# Patient Record
Sex: FEMALE | Race: WHITE | Employment: FULL TIME | URBAN - NONMETROPOLITAN AREA
[De-identification: names, ages, dates, MRNs, and addresses within clinical notes are randomized per-mention and may not be internally consistent; named-entity substitution may affect disease eponyms.]

---

## 2020-02-28 ENCOUNTER — HOSPITAL ENCOUNTER (EMERGENCY)
Age: 40
Discharge: HOME | End: 2020-02-28
Payer: COMMERCIAL

## 2020-02-28 VITALS
WEIGHT: 188 LBS | TEMPERATURE: 97.4 F | SYSTOLIC BLOOD PRESSURE: 161 MMHG | HEART RATE: 109 BPM | OXYGEN SATURATION: 97 % | RESPIRATION RATE: 16 BRPM | HEIGHT: 66 IN | DIASTOLIC BLOOD PRESSURE: 92 MMHG | BODY MASS INDEX: 30.22 KG/M2

## 2020-02-28 LAB
FLU A ANTIGEN: NEGATIVE
INFLUENZA B AG, EIA: NEGATIVE

## 2020-02-28 PROCEDURE — 99203 OFFICE O/P NEW LOW 30 MIN: CPT

## 2020-02-28 PROCEDURE — 87804 INFLUENZA ASSAY W/OPTIC: CPT

## 2020-02-28 PROCEDURE — 99202 OFFICE O/P NEW SF 15 MIN: CPT | Performed by: NURSE PRACTITIONER

## 2020-02-28 RX ORDER — BROMPHENIRAMINE MALEATE, PSEUDOEPHEDRINE HYDROCHLORIDE, AND DEXTROMETHORPHAN HYDROBROMIDE 2; 30; 10 MG/5ML; MG/5ML; MG/5ML
10 SYRUP ORAL 4 TIMES DAILY PRN
Qty: 200 ML | Refills: 0 | Status: SHIPPED | OUTPATIENT
Start: 2020-02-28

## 2020-02-28 RX ORDER — IBUPROFEN 200 MG
200 TABLET ORAL EVERY 6 HOURS PRN
COMMUNITY

## 2020-02-28 SDOH — HEALTH STABILITY: MENTAL HEALTH: HOW OFTEN DO YOU HAVE A DRINK CONTAINING ALCOHOL?: NEVER

## 2020-02-28 ASSESSMENT — ENCOUNTER SYMPTOMS
EYE ITCHING: 0
STRIDOR: 0
ALLERGIC/IMMUNOLOGIC NEGATIVE: 1
WHEEZING: 0
RHINORRHEA: 0
SINUS PRESSURE: 0
VOMITING: 0
COUGH: 1
EYE REDNESS: 0
TROUBLE SWALLOWING: 0
ABDOMINAL PAIN: 0
DIARRHEA: 1
NAUSEA: 0
SHORTNESS OF BREATH: 0
VOICE CHANGE: 0
SORE THROAT: 0
BACK PAIN: 0

## 2020-02-28 NOTE — ED PROVIDER NOTES
Hysterectomy. CURRENT MEDICATIONS       Previous Medications    IBUPROFEN (ADVIL;MOTRIN) 200 MG TABLET    Take 200 mg by mouth every 6 hours as needed for Pain       ALLERGIES     Patient is has No Known Allergies. FAMILY HISTORY     Patient'sfamily history is not on file. SOCIAL HISTORY     Patient  reports that she has never smoked. She has never used smokeless tobacco. She reports that she does not drink alcohol or use drugs. PHYSICAL EXAM     ED TRIAGE VITALS  BP: (!) 161/92, Temp: 97.4 °F (36.3 °C), Pulse: 109, Resp: 16, SpO2: 97 %  Physical Exam  Vitals signs and nursing note reviewed. Constitutional:       General: She is not in acute distress. Appearance: Normal appearance. She is well-developed and well-groomed. She is not ill-appearing, toxic-appearing or diaphoretic. HENT:      Head: Normocephalic and atraumatic. Right Ear: Hearing, tympanic membrane, ear canal and external ear normal.      Left Ear: Hearing, tympanic membrane, ear canal and external ear normal.      Nose: Nose normal. No mucosal edema, congestion or rhinorrhea. Mouth/Throat:      Lips: Pink. No lesions. Mouth: Mucous membranes are moist. No oral lesions. Tongue: No lesions. Palate: No lesions. Pharynx: Oropharynx is clear. Uvula midline. Posterior oropharyngeal erythema (mild) present. No pharyngeal swelling, oropharyngeal exudate or uvula swelling. Tonsils: No tonsillar exudate or tonsillar abscesses. Swellin+ on the right. 2+ on the left. Eyes:      General: Lids are normal.         Right eye: No discharge. Left eye: No discharge. Conjunctiva/sclera: Conjunctivae normal.      Right eye: Right conjunctiva is not injected. Left eye: Left conjunctiva is not injected. Pupils: Pupils are equal.   Neck:      Musculoskeletal: Normal range of motion. Cardiovascular:      Rate and Rhythm: Regular rhythm. Tachycardia present.    Pulmonary:      Effort: Pulmonary PATIENT REFERRED TO:  COLIN Dunlap CNP  [address] [facility]  [phone]    Schedule an appointment as soon as possible for a visit         COLIN Nunez - [address] [facility] COLIN Ceballos CNP  02/28/20 1107

## 2021-02-04 ENCOUNTER — HOSPITAL ENCOUNTER (OUTPATIENT)
Age: 41
Setting detail: SPECIMEN
Discharge: HOME | End: 2021-02-04
Payer: COMMERCIAL

## 2021-02-04 LAB
HCT VFR BLD CALC: 39.6 % (ref 36.3–47.1)
HEMOGLOBIN: 12.9 G/DL (ref 11.9–15.1)
MCH RBC QN AUTO: 27.8 PG (ref 25.2–33.5)
MCHC RBC AUTO-ENTMCNC: 32.6 G/DL (ref 28.4–34.8)
MCV RBC AUTO: 85.3 FL (ref 82.6–102.9)
NRBC AUTOMATED: 0 PER 100 WBC
PDW BLD-RTO: 12.5 % (ref 11.8–14.4)
PLATELET # BLD: 299 K/UL (ref 138–453)
PMV BLD AUTO: 10.3 FL (ref 8.1–13.5)
RBC # BLD: 4.64 M/UL (ref 3.95–5.11)
WBC # BLD: 7.7 K/UL (ref 3.5–11.3)

## 2021-02-05 LAB
ALBUMIN SERPL-MCNC: 4.5 G/DL (ref 3.5–5.2)
ALBUMIN/GLOBULIN RATIO: 1.7 (ref 1–2.5)
ALP BLD-CCNC: 68 U/L (ref 35–104)
ALT SERPL-CCNC: 32 U/L (ref 5–33)
ANION GAP SERPL CALCULATED.3IONS-SCNC: 13 MMOL/L (ref 9–17)
AST SERPL-CCNC: 27 U/L
BILIRUB SERPL-MCNC: 0.41 MG/DL (ref 0.3–1.2)
BUN BLDV-MCNC: 11 MG/DL (ref 6–20)
BUN/CREAT BLD: NORMAL (ref 9–20)
CALCIUM SERPL-MCNC: 9.6 MG/DL (ref 8.6–10.4)
CHLORIDE BLD-SCNC: 105 MMOL/L (ref 98–107)
CHOLESTEROL/HDL RATIO: 3.6
CHOLESTEROL: 186 MG/DL
CO2: 22 MMOL/L (ref 20–31)
CREAT SERPL-MCNC: 0.57 MG/DL (ref 0.5–0.9)
GFR AFRICAN AMERICAN: >60 ML/MIN
GFR NON-AFRICAN AMERICAN: >60 ML/MIN
GFR SERPL CREATININE-BSD FRML MDRD: NORMAL ML/MIN/{1.73_M2}
GFR SERPL CREATININE-BSD FRML MDRD: NORMAL ML/MIN/{1.73_M2}
GLUCOSE BLD-MCNC: 70 MG/DL (ref 70–99)
HDLC SERPL-MCNC: 51 MG/DL
LDL CHOLESTEROL: 114 MG/DL (ref 0–130)
POTASSIUM SERPL-SCNC: 4.3 MMOL/L (ref 3.7–5.3)
SODIUM BLD-SCNC: 140 MMOL/L (ref 135–144)
TOTAL PROTEIN: 7.2 G/DL (ref 6.4–8.3)
TRIGL SERPL-MCNC: 103 MG/DL
TSH SERPL DL<=0.05 MIU/L-ACNC: 1.24 MIU/L (ref 0.3–5)
VITAMIN D 25-HYDROXY: 24.8 NG/ML (ref 30–100)
VLDLC SERPL CALC-MCNC: NORMAL MG/DL (ref 1–30)

## 2021-09-11 ENCOUNTER — HOSPITAL ENCOUNTER (EMERGENCY)
Age: 41
Discharge: HOME | End: 2021-09-11
Payer: COMMERCIAL

## 2021-09-11 VITALS
OXYGEN SATURATION: 97 % | HEIGHT: 66 IN | DIASTOLIC BLOOD PRESSURE: 73 MMHG | WEIGHT: 200 LBS | TEMPERATURE: 97.3 F | SYSTOLIC BLOOD PRESSURE: 129 MMHG | HEART RATE: 86 BPM | BODY MASS INDEX: 32.14 KG/M2 | RESPIRATION RATE: 16 BRPM

## 2021-09-11 DIAGNOSIS — U07.1 COVID-19: Primary | ICD-10-CM

## 2021-09-11 LAB — SARS-COV-2, NAA: DETECTED

## 2021-09-11 PROCEDURE — 87635 SARS-COV-2 COVID-19 AMP PRB: CPT

## 2021-09-11 PROCEDURE — 99214 OFFICE O/P EST MOD 30 MIN: CPT | Performed by: NURSE PRACTITIONER

## 2021-09-11 PROCEDURE — 99213 OFFICE O/P EST LOW 20 MIN: CPT

## 2021-09-11 RX ORDER — ACETAMINOPHEN 500 MG
500 TABLET ORAL EVERY 6 HOURS PRN
COMMUNITY

## 2021-09-11 RX ORDER — METHYLPREDNISOLONE 4 MG/1
TABLET ORAL
Qty: 1 KIT | Refills: 0 | Status: SHIPPED | OUTPATIENT
Start: 2021-09-11 | End: 2021-09-17

## 2021-09-11 ASSESSMENT — ENCOUNTER SYMPTOMS
SINUS PAIN: 1
SORE THROAT: 1
SHORTNESS OF BREATH: 0
COUGH: 1
GASTROINTESTINAL NEGATIVE: 1
SINUS PRESSURE: 1
WHEEZING: 0
EYES NEGATIVE: 1
CHEST TIGHTNESS: 0

## 2021-09-11 ASSESSMENT — PAIN DESCRIPTION - DESCRIPTORS: DESCRIPTORS: ACHING

## 2021-09-11 ASSESSMENT — PAIN SCALES - GENERAL: PAINLEVEL_OUTOF10: 3

## 2021-09-11 ASSESSMENT — PAIN DESCRIPTION - LOCATION: LOCATION: HEAD

## 2021-09-11 NOTE — ED PROVIDER NOTES
Gastrointestinal: Negative. Endocrine: Negative for cold intolerance and heat intolerance. Genitourinary: Negative. Musculoskeletal: Positive for myalgias and neck pain. Skin: Negative. Allergic/Immunologic: Positive for environmental allergies. Neurological: Positive for dizziness and headaches. Hematological: Negative for adenopathy. Does not bruise/bleed easily. Psychiatric/Behavioral: Negative. PAST MEDICAL HISTORY         Diagnosis Date    Hypertension        SURGICAL HISTORY     Patient  has a past surgical history that includes Hysterectomy. CURRENT MEDICATIONS       Previous Medications    ACETAMINOPHEN (TYLENOL) 500 MG TABLET    Take 500 mg by mouth every 6 hours as needed for Pain    BROMPHENIRAMINE-PSEUDOEPHEDRINE-DM 2-30-10 MG/5ML SYRUP    Take 10 mLs by mouth 4 times daily as needed for Congestion or Cough    IBUPROFEN (ADVIL;MOTRIN) 200 MG TABLET    Take 200 mg by mouth every 6 hours as needed for Pain    LISINOPRIL-HYDROCHLOROTHIAZIDE PO    Take by mouth    MULTIPLE VITAMINS-MINERALS (IMMUNE SUPPORT PO)    Take by mouth       ALLERGIES     Patient is has No Known Allergies. FAMILY HISTORY     Patient'sfamily history includes Cancer in her father and mother. SOCIAL HISTORY     Patient  reports that she has never smoked. She has never used smokeless tobacco. She reports that she does not drink alcohol and does not use drugs. PHYSICAL EXAM     ED TRIAGE VITALS  BP: 129/73, Temp: 97.3 °F (36.3 °C), Pulse: 86, Resp: 16, SpO2: 97 %  Physical Exam  Vitals and nursing note reviewed. Constitutional:       Appearance: Normal appearance. She is well-developed and normal weight. She is ill-appearing. HENT:      Head: Normocephalic and atraumatic. Right Ear: Tympanic membrane, ear canal and external ear normal.      Left Ear: Tympanic membrane, ear canal and external ear normal.      Nose: Congestion present. Mouth/Throat:      Mouth: Mucous membranes are dry. Pharynx: Posterior oropharyngeal erythema present. No oropharyngeal exudate. Eyes:      General:         Right eye: No discharge. Left eye: No discharge. Conjunctiva/sclera: Conjunctivae normal.   Neck:      Thyroid: No thyromegaly. Cardiovascular:      Rate and Rhythm: Normal rate and regular rhythm. Pulses: Normal pulses. Heart sounds: Normal heart sounds. Pulmonary:      Effort: Pulmonary effort is normal. No respiratory distress. Breath sounds: Normal breath sounds. No wheezing or rales. Chest:      Chest wall: No tenderness. Abdominal:      General: Bowel sounds are normal. There is no distension. Palpations: Abdomen is soft. Tenderness: There is no abdominal tenderness. Musculoskeletal:         General: Normal range of motion. Cervical back: Normal range of motion and neck supple. Tenderness present. No muscular tenderness. Lymphadenopathy:      Cervical: No cervical adenopathy. Skin:     General: Skin is warm and dry. Capillary Refill: Capillary refill takes less than 2 seconds. Coloration: Skin is not pale. Findings: No erythema or rash. Neurological:      General: No focal deficit present. Mental Status: She is alert and oriented to person, place, and time. Mental status is at baseline. Cranial Nerves: No cranial nerve deficit. Motor: No abnormal muscle tone. Coordination: Coordination normal.      Deep Tendon Reflexes: Reflexes are normal and symmetric. Reflexes normal.   Psychiatric:         Behavior: Behavior normal.         Thought Content:  Thought content normal.         Judgment: Judgment normal.         DIAGNOSTIC RESULTS   Labs:   Results for orders placed or performed during the hospital encounter of 09/11/21   COVID-19, Rapid   Result Value Ref Range    SARS-CoV-2, LUIS ENRIQUE DETECTED (AA) NOT DETECTED       IMAGING:  No orders to display     URGENT CARE COURSE:     Vitals:    09/11/21 0842   BP: 129/73 Pulse: 86   Resp: 16   Temp: 97.3 °F (36.3 °C)   SpO2: 97%   Weight: 200 lb (90.7 kg)   Height: 5' 6\" (1.676 m)       Medications - No data to display  PROCEDURES:  None  FINALIMPRESSION    I have reviewed the patient's medical history in detail and updated the computerized patient record. HPI/ROS per the patient and caregiver. Overall non toxic in appearance. Answers questions appropriately. Conditions discussed and addressed this visit include:     Patient appears ill but non-toxic and well hydrated. Patient is positive for covid 19. Discussed care of the patient with viral illness, signs to report back to ER for. Patient is to take medications as directed. Continue all home medications. Potential side effects of the medications were reviewed with the patient in detail. The patient can increase fluids. The patient can have Tylenol or Motrin for pain and fever as needed. Discussed with patient signs and symptoms that would require emergent evaluation and treatment. The patient will follow-up with their family physician or go to the ER if they develop any worsening symptoms. The patient was discharged in stable condition      1. COVID-19        DISPOSITION/PLAN   DISPOSITION      PATIENT REFERRED TO:  COLIN Combs CNP 38    In 3 days  As needed, If symptoms worsen    DISCHARGE MEDICATIONS:  New Prescriptions    METHYLPREDNISOLONE (MEDROL, CARMENCITA,) 4 MG TABLET    Take by mouth.      Current Discharge Medication List          COLIN Harmon CNP, APRN - CNP  09/11/21 1481

## 2021-09-11 NOTE — ED TRIAGE NOTES
To room  1 c/o daughter positive covid last Saturday.   C/o cough headache  Low grasde fevers body aches

## 2021-09-13 ENCOUNTER — CARE COORDINATION (OUTPATIENT)
Dept: CARE COORDINATION | Age: 41
End: 2021-09-13

## 2021-09-13 NOTE — ACP (ADVANCE CARE PLANNING)
Advance Care Planning   Healthcare Decision Maker:    Primary Decision Maker: Maxim [facility] - Spouse - [phone]    Secondary Decision Maker: Lucila Jeffries - Brother/Sister - [phone]    Today we documented Decision Maker(s) consistent with Legal Next of Kin hierarchy.      Healthcare Decision Maker information reviewed and updated per patient's request.

## 2021-09-13 NOTE — CARE COORDINATION
Patient contacted regarding COVID-19 exposure and diagnosis. Discussed COVID-19 related testing which was available at this time. Test results were positive. Patient informed of results, if available? Patient aware of positive results prior to ACM f/u call being completed. Ambulatory Care Manager contacted the patient by telephone to perform post discharge assessment. Call within 2 business days of discharge: Yes. Verified name and  with patient as identifiers. Provided introduction to self, and explanation of the CTN/ACM role, and reason for call due to risk factors for infection and/or exposure to COVID-19. Symptoms reviewed with patient who verbalized the following symptoms: pain or aching joints, cough, no new symptoms and no worsening symptoms. Due to no new or worsening symptoms encounter was not routed to provider for escalation. Discussed follow-up appointments. If no appointment was previously scheduled, appointment scheduling offered: Patient will call and schedule PCP f/u appointment on her own. Patient denied any need for assistance. [facility] follow up appointment(s): No future appointments. Non-[facility] follow up appointment(s): N/A     Non-face-to-face services provided:  Obtained and reviewed discharge summary and/or continuity of care documents  Education of patient/family/caregiver/guardian to support self-management-COVID-19 education reviewed with patient. Patient educated on signs/symptoms to call and report and the importance of early symptom recognition. ACM reviewed the need to self quarantine as directed and to f/u with local health department. Patient verbalized understanding. Advance Care Planning:   Does patient have an Advance Directive:  decision maker updated. ACP note completed. Educated patient about risk for severe COVID-19 due to risk factors according to CDC guidelines.  ACM reviewed discharge instructions, medical action plan and red flag symptoms with the patient who verbalized understanding. Discussed COVID vaccination status: No.  Discussed exposure protocols and quarantine with CDC Guidelines. Patient was given an opportunity to verbalize any questions and concerns and agrees to contact ACM or health care provider for questions related to their healthcare. Reviewed and educated patient on any new and changed medications related to discharge diagnosis     Was patient discharged with a pulse oximeter? No    ACM provided contact information. No further follow-up call identified. Episode of Care resolved. Patient called for covid-19 f/u s/p recent  visit for c/o headache, cough, and body aches. Patient denied any new/change/worsening of symptoms. Patient reported she is doing \"ok\" and she has started medications as directed. Patient denied any questions re: her discharge instructions and she will call and schedule PCP f/u appointment on her own. Patient was educated on signs/symptoms to call and report and the importance of early symptom recognition. ACM reviewed the need for patient to self quarantine as directed and to f/u with local health department. Patient verbalized understanding. Covid-19 education was reviewed with patient, and patient verbalized understanding. Patient was reminded to call covid-19 hotline number with any new symptoms or questions/concerns. Patient verbalized understanding. Patient denied any other questions, concerns, or needs.

## 2022-04-04 ENCOUNTER — HOSPITAL ENCOUNTER (OUTPATIENT)
Age: 42
Setting detail: SPECIMEN
Discharge: HOME | End: 2022-04-04

## 2022-04-04 LAB
ALBUMIN SERPL-MCNC: 4.9 G/DL (ref 3.5–5.2)
ALBUMIN/GLOBULIN RATIO: 2 (ref 1–2.5)
ALP BLD-CCNC: 65 U/L (ref 35–104)
ALT SERPL-CCNC: 25 U/L (ref 5–33)
ANION GAP SERPL CALCULATED.3IONS-SCNC: 9 MMOL/L (ref 9–17)
AST SERPL-CCNC: 22 U/L
BILIRUB SERPL-MCNC: 0.42 MG/DL (ref 0.3–1.2)
BUN BLDV-MCNC: 10 MG/DL (ref 6–20)
CALCIUM SERPL-MCNC: 9.7 MG/DL (ref 8.6–10.4)
CHLORIDE BLD-SCNC: 97 MMOL/L (ref 98–107)
CHOLESTEROL/HDL RATIO: 3.9
CHOLESTEROL: 161 MG/DL
CO2: 32 MMOL/L (ref 20–31)
CREAT SERPL-MCNC: 0.73 MG/DL (ref 0.5–0.9)
GFR AFRICAN AMERICAN: >60 ML/MIN
GFR NON-AFRICAN AMERICAN: >60 ML/MIN
GFR SERPL CREATININE-BSD FRML MDRD: ABNORMAL ML/MIN/{1.73_M2}
GLUCOSE BLD-MCNC: 86 MG/DL (ref 70–99)
HCT VFR BLD CALC: 38.8 % (ref 36.3–47.1)
HDLC SERPL-MCNC: 41 MG/DL
HEMOGLOBIN: 13.5 G/DL (ref 11.9–15.1)
LDL CHOLESTEROL: 72 MG/DL (ref 0–130)
MCH RBC QN AUTO: 28.8 PG (ref 25.2–33.5)
MCHC RBC AUTO-ENTMCNC: 34.8 G/DL (ref 28.4–34.8)
MCV RBC AUTO: 82.7 FL (ref 82.6–102.9)
NRBC AUTOMATED: 0 PER 100 WBC
PDW BLD-RTO: 12.2 % (ref 11.8–14.4)
PLATELET # BLD: 264 K/UL (ref 138–453)
PMV BLD AUTO: 11.1 FL (ref 8.1–13.5)
POTASSIUM SERPL-SCNC: 3.7 MMOL/L (ref 3.7–5.3)
RBC # BLD: 4.69 M/UL (ref 3.95–5.11)
SODIUM BLD-SCNC: 138 MMOL/L (ref 135–144)
TOTAL PROTEIN: 7.4 G/DL (ref 6.4–8.3)
TRIGL SERPL-MCNC: 241 MG/DL
TSH SERPL DL<=0.05 MIU/L-ACNC: 1.55 UIU/ML (ref 0.3–5)
VITAMIN D 25-HYDROXY: 18.4 NG/ML
WBC # BLD: 6.2 K/UL (ref 3.5–11.3)

## 2022-04-19 ENCOUNTER — HOSPITAL ENCOUNTER (OUTPATIENT)
Dept: WOMENS IMAGING | Age: 42
Discharge: HOME | End: 2022-04-19

## 2022-04-19 DIAGNOSIS — Z12.31 VISIT FOR SCREENING MAMMOGRAM: ICD-10-CM

## 2022-04-22 ENCOUNTER — HOSPITAL ENCOUNTER (OUTPATIENT)
Dept: WOMENS IMAGING | Age: 42
Discharge: HOME | End: 2022-04-22

## 2022-04-22 DIAGNOSIS — Z00.6 ENCOUNTER FOR EXAMINATION FOR NORMAL COMPARISON OR CONTROL IN CLINICAL RESEARCH PROGRAM: ICD-10-CM

## 2023-10-30 ENCOUNTER — HOSPITAL ENCOUNTER (OUTPATIENT)
Age: 43
Discharge: HOME | End: 2023-10-30
Payer: COMMERCIAL

## 2023-10-30 PROCEDURE — 93005 ELECTROCARDIOGRAM TRACING: CPT | Performed by: PEDIATRICS

## 2023-10-31 ENCOUNTER — HOSPITAL ENCOUNTER (EMERGENCY)
Age: 43
Discharge: HOME | End: 2023-10-31
Payer: COMMERCIAL

## 2023-10-31 VITALS
SYSTOLIC BLOOD PRESSURE: 121 MMHG | RESPIRATION RATE: 16 BRPM | BODY MASS INDEX: 32.14 KG/M2 | HEART RATE: 63 BPM | OXYGEN SATURATION: 95 % | DIASTOLIC BLOOD PRESSURE: 77 MMHG | TEMPERATURE: 98.7 F | WEIGHT: 200 LBS | HEIGHT: 66 IN

## 2023-10-31 DIAGNOSIS — E87.6 HYPOKALEMIA: ICD-10-CM

## 2023-10-31 DIAGNOSIS — R55 VASOVAGAL EPISODE: Primary | ICD-10-CM

## 2023-10-31 LAB
ANION GAP SERPL CALC-SCNC: 14 MEQ/L (ref 8–16)
BASOPHILS ABSOLUTE: 0.1 THOU/MM3 (ref 0–0.1)
BASOPHILS NFR BLD AUTO: 0.6 %
BUN SERPL-MCNC: 13 MG/DL (ref 7–22)
CALCIUM SERPL-MCNC: 9.8 MG/DL (ref 8.5–10.5)
CHLORIDE SERPL-SCNC: 98 MEQ/L (ref 98–111)
CO2 SERPL-SCNC: 28 MEQ/L (ref 23–33)
CREAT SERPL-MCNC: 0.8 MG/DL (ref 0.4–1.2)
DEPRECATED RDW RBC AUTO: 38.5 FL (ref 35–45)
EOSINOPHIL NFR BLD AUTO: 6.4 %
EOSINOPHILS ABSOLUTE: 0.6 THOU/MM3 (ref 0–0.4)
ERYTHROCYTE [DISTWIDTH] IN BLOOD BY AUTOMATED COUNT: 12.7 % (ref 11.5–14.5)
FLUAV RNA RESP QL NAA+PROBE: NOT DETECTED
FLUBV RNA RESP QL NAA+PROBE: NOT DETECTED
GFR SERPL CREATININE-BSD FRML MDRD: > 60 ML/MIN/1.73M2
GLUCOSE SERPL-MCNC: 124 MG/DL (ref 70–108)
HCT VFR BLD AUTO: 41.9 % (ref 37–47)
HGB BLD-MCNC: 14.2 GM/DL (ref 12–16)
IMM GRANULOCYTES # BLD AUTO: 0.02 THOU/MM3 (ref 0–0.07)
IMM GRANULOCYTES NFR BLD AUTO: 0.2 %
LYMPHOCYTES ABSOLUTE: 2.7 THOU/MM3 (ref 1–4.8)
LYMPHOCYTES NFR BLD AUTO: 30.1 %
MCH RBC QN AUTO: 28.7 PG (ref 26–33)
MCHC RBC AUTO-ENTMCNC: 33.9 GM/DL (ref 32.2–35.5)
MCV RBC AUTO: 84.6 FL (ref 81–99)
MONOCYTES ABSOLUTE: 0.6 THOU/MM3 (ref 0.4–1.3)
MONOCYTES NFR BLD AUTO: 7 %
NEUTROPHILS NFR BLD AUTO: 55.7 %
NRBC BLD AUTO-RTO: 0 /100 WBC
OSMOLALITY SERPL CALC.SUM OF ELEC: 280.9 MOSMOL/KG (ref 275–300)
PLATELET # BLD AUTO: 281 THOU/MM3 (ref 130–400)
PMV BLD AUTO: 9.8 FL (ref 9.4–12.4)
POTASSIUM SERPL-SCNC: 3 MEQ/L (ref 3.5–5.2)
RBC # BLD AUTO: 4.95 MILL/MM3 (ref 4.2–5.4)
SARS-COV-2 RNA RESP QL NAA+PROBE: NOT DETECTED
SEGMENTED NEUTROPHILS ABSOLUTE COUNT: 5 THOU/MM3 (ref 1.8–7.7)
SODIUM SERPL-SCNC: 140 MEQ/L (ref 135–145)
TROPONIN, HIGH SENSITIVITY: < 6 NG/L (ref 0–12)
WBC # BLD AUTO: 9 THOU/MM3 (ref 4.8–10.8)

## 2023-10-31 PROCEDURE — 99284 EMERGENCY DEPT VISIT MOD MDM: CPT

## 2023-10-31 PROCEDURE — 85025 COMPLETE CBC W/AUTO DIFF WBC: CPT

## 2023-10-31 PROCEDURE — 80048 BASIC METABOLIC PNL TOTAL CA: CPT

## 2023-10-31 PROCEDURE — 93010 ELECTROCARDIOGRAM REPORT: CPT | Performed by: NUCLEAR MEDICINE

## 2023-10-31 PROCEDURE — 6370000000 HC RX 637 (ALT 250 FOR IP): Performed by: NURSE PRACTITIONER

## 2023-10-31 PROCEDURE — 84484 ASSAY OF TROPONIN QUANT: CPT

## 2023-10-31 PROCEDURE — 87636 SARSCOV2 & INF A&B AMP PRB: CPT

## 2023-10-31 PROCEDURE — 93005 ELECTROCARDIOGRAM TRACING: CPT | Performed by: NURSE PRACTITIONER

## 2023-10-31 PROCEDURE — 36415 COLL VENOUS BLD VENIPUNCTURE: CPT

## 2023-10-31 RX ORDER — POTASSIUM CHLORIDE 750 MG/1
40 TABLET, FILM COATED, EXTENDED RELEASE ORAL ONCE
Status: COMPLETED | OUTPATIENT
Start: 2023-10-31 | End: 2023-10-31

## 2023-10-31 RX ORDER — 0.9 % SODIUM CHLORIDE 0.9 %
1000 INTRAVENOUS SOLUTION INTRAVENOUS ONCE
Status: DISCONTINUED | OUTPATIENT
Start: 2023-10-31 | End: 2023-10-31 | Stop reason: HOSPADM

## 2023-10-31 RX ADMIN — POTASSIUM CHLORIDE 40 MEQ: 750 TABLET, FILM COATED, EXTENDED RELEASE ORAL at 04:49

## 2023-10-31 ASSESSMENT — PAIN DESCRIPTION - LOCATION: LOCATION: HEAD

## 2023-10-31 ASSESSMENT — PAIN - FUNCTIONAL ASSESSMENT: PAIN_FUNCTIONAL_ASSESSMENT: 0-10

## 2023-10-31 ASSESSMENT — PAIN SCALES - GENERAL
PAINLEVEL_OUTOF10: 4
PAINLEVEL_OUTOF10: 4

## 2023-10-31 NOTE — DISCHARGE INSTRUCTIONS
Drink plenty of water. Follow up with your pcp and return if symptoms worsen.   Keep track of these episodes and discuss with your pcp so you can determine if you need further work up

## 2023-10-31 NOTE — ED TRIAGE NOTES
Patient presents to ED with chief complaint of Hypotension. Patient states that she had became flushed and started sweating while at home, and when she took her blood pressure it was 50s/30s. Patient states she has never had a problem with hypotension, and usually has hypertension. Patient recently started taking her blood pressure medication again after being off of it for a couple months. Patient also states she has a headache rated at a 4/10. Patient resting in bed. Respirations easy and unlabored. No distress noted. Call light within reach.

## 2023-10-31 NOTE — ED NOTES
Pt states she has head pain rated 4/10 pain feels like a headache. Pt states her chest feels tight due to her anxiety. Pt states that when she had her hot flash she vomited twice. Pt resting in bed with family at bedside. Pt and family have no requests at this time.       Lincoln Barksdale RN  10/31/23 1973

## 2023-11-01 LAB
EKG ATRIAL RATE: 68 BPM
EKG ATRIAL RATE: 71 BPM
EKG P AXIS: 44 DEGREES
EKG P AXIS: 54 DEGREES
EKG P-R INTERVAL: 166 MS
EKG P-R INTERVAL: 176 MS
EKG Q-T INTERVAL: 416 MS
EKG Q-T INTERVAL: 424 MS
EKG QRS DURATION: 102 MS
EKG QRS DURATION: 98 MS
EKG QTC CALCULATION (BAZETT): 450 MS
EKG QTC CALCULATION (BAZETT): 452 MS
EKG R AXIS: -24 DEGREES
EKG R AXIS: 22 DEGREES
EKG T AXIS: 29 DEGREES
EKG T AXIS: 36 DEGREES
EKG VENTRICULAR RATE: 68 BPM
EKG VENTRICULAR RATE: 71 BPM

## 2024-01-04 ENCOUNTER — HOSPITAL ENCOUNTER (EMERGENCY)
Age: 44
Discharge: HOME | End: 2024-01-04
Payer: COMMERCIAL

## 2024-01-04 VITALS
DIASTOLIC BLOOD PRESSURE: 77 MMHG | WEIGHT: 206 LBS | TEMPERATURE: 97.3 F | RESPIRATION RATE: 18 BRPM | HEART RATE: 127 BPM | OXYGEN SATURATION: 99 % | BODY MASS INDEX: 33.25 KG/M2 | SYSTOLIC BLOOD PRESSURE: 126 MMHG

## 2024-01-04 DIAGNOSIS — H66.93 BILATERAL OTITIS MEDIA, UNSPECIFIED OTITIS MEDIA TYPE: Primary | ICD-10-CM

## 2024-01-04 DIAGNOSIS — G93.31 POST VIRAL SYNDROME: ICD-10-CM

## 2024-01-04 PROCEDURE — 99213 OFFICE O/P EST LOW 20 MIN: CPT | Performed by: NURSE PRACTITIONER

## 2024-01-04 PROCEDURE — 99213 OFFICE O/P EST LOW 20 MIN: CPT

## 2024-01-04 RX ORDER — AZELASTINE 1 MG/ML
1 SPRAY, METERED NASAL 2 TIMES DAILY
Qty: 30 ML | Refills: 0 | Status: SHIPPED | OUTPATIENT
Start: 2024-01-04

## 2024-01-04 RX ORDER — DEXTROMETHORPHAN HYDROBROMIDE AND PROMETHAZINE HYDROCHLORIDE 15; 6.25 MG/5ML; MG/5ML
5 SYRUP ORAL 4 TIMES DAILY PRN
Qty: 118 ML | Refills: 0 | Status: SHIPPED | OUTPATIENT
Start: 2024-01-04 | End: 2024-01-11

## 2024-01-04 RX ORDER — CETIRIZINE HYDROCHLORIDE, PSEUDOEPHEDRINE HYDROCHLORIDE 5; 120 MG/1; MG/1
1 TABLET, FILM COATED, EXTENDED RELEASE ORAL 2 TIMES DAILY
Qty: 60 TABLET | Refills: 0 | Status: SHIPPED | OUTPATIENT
Start: 2024-01-04 | End: 2024-02-03

## 2024-01-04 RX ORDER — AMOXICILLIN AND CLAVULANATE POTASSIUM 875; 125 MG/1; MG/1
1 TABLET, FILM COATED ORAL 2 TIMES DAILY
Qty: 14 TABLET | Refills: 0 | Status: SHIPPED | OUTPATIENT
Start: 2024-01-04 | End: 2024-01-11

## 2024-01-04 RX ORDER — PREDNISONE 20 MG/1
20 TABLET ORAL 2 TIMES DAILY
Qty: 10 TABLET | Refills: 0 | Status: SHIPPED | OUTPATIENT
Start: 2024-01-04 | End: 2024-01-09

## 2024-01-04 ASSESSMENT — ENCOUNTER SYMPTOMS
VOMITING: 0
SHORTNESS OF BREATH: 0
APNEA: 0
CHEST TIGHTNESS: 0
RHINORRHEA: 1
SINUS PRESSURE: 1
WHEEZING: 0
DIARRHEA: 0
STRIDOR: 0
CHOKING: 0
COUGH: 1
ABDOMINAL PAIN: 0
SORE THROAT: 1

## 2024-01-04 NOTE — DISCHARGE INSTRUCTIONS
Suggestions for symptom management that are available over the counter.   If you have questions regarding allergies or contraindications to use, please speak to the pharmacy staff or your family provider.    For fevers or pain: acetaminophen (Tylenol), ibuprofen (Motrin)  For dry cough: medications containing dextromethorphan, such as Delsym, Robitussin DM or Mucinex DM and medicated throat lozenges  For congestion or sinus pressure: decongestant nasal sprays, such as Afrin (for up to 3 days), medications containing guaifenesin to help break up mucus, such as Mucinex or Robitussin, nasal steroid sprays, such as Flonase, Sensimist, Rhinocort or Nasonex and saline nasal sprays, neti pot or sinus rinse bottle  For runny nose, sneezing or watery/itchy eyes: less sedating antihistamines, such as loratidine (Claritin), fexofenadine (Allegra) or Cetirizine (Zyrtec) and antihistamine eye drops, such as ketotifen (Zaditor, Alaway) or olopatadine (Pataday)  For sore throat:  Chloraseptic throat spray or sore throat lozenges or  Mucinex Instasoothe Sore Throat & Pain Cherry Spray  If you have high blood pressure, a brand like Coricidin HBP may be an option.you should avoid medications containing pseudoephedrine or phenylephrine, such as Sudafed,  running a humidifier in your bedroom may be helpful for many of your symptoms.  If your cough is keeping you awake at night, you can try raising your head with an extra pillow.  If the skin around your nose and lips becomes sore, you can put some petroleum jelly on the area.      Suggestions for over-the-counter supplements to help your immune system, if you have questions regarding allergies or contraindications to use, please speak to the pharmacy staff or your family provider.    Multi-vitamin/multi-mineral daily   Vitamin D3 3000 IU daily  Zinc 30 mg daily  Vitamin C 1000 mg twice daily  B-100 complex as daily as directed on bottle  Probiotic/Prebiotic siegel  Gargle with  mouthwash 3 times daily, may use Scope, Crest, or Listerine.    COLD-EEZE  over the counter: Use as directed on package. Zinc gluconate lozenges  are used to help make cold symptoms less severe or shorter in duration. This includes sore throat, cough, sneezing, stuffy nose, and a hoarse voice.    Adequate nutrition, hydration, and rest are also important to good health and recovery.

## 2024-01-04 NOTE — ED TRIAGE NOTES
Klarissa arrives to room with complaint of tested positive for covid symptoms started 2 weeks ago.

## 2024-01-04 NOTE — ED PROVIDER NOTES
a past surgical history that includes Hysterectomy.    CURRENT MEDICATIONS       Discharge Medication List as of 1/4/2024 11:49 AM        CONTINUE these medications which have NOT CHANGED    Details   LISINOPRIL-HYDROCHLOROTHIAZIDE PO Take by mouthHistorical Med      acetaminophen (TYLENOL) 500 MG tablet Take 1 tablet by mouth every 6 hours as needed for PainHistorical Med      Multiple Vitamins-Minerals (IMMUNE SUPPORT PO) Take by mouthHistorical Med      ibuprofen (ADVIL;MOTRIN) 200 MG tablet Take 1 tablet by mouth every 6 hours as needed for PainHistorical Med             ALLERGIES     Patient is has No Known Allergies.    FAMILY HISTORY     Patient's family history includes Cancer in her father and mother.    SOCIAL HISTORY     Patient  reports that she has never smoked. She has never used smokeless tobacco. She reports that she does not drink alcohol and does not use drugs.    PHYSICAL EXAM     ED TRIAGE VITALS  BP: 126/77, Temp: 97.3 °F (36.3 °C), Pulse: (!) 127, Respirations: 18, SpO2: 99 %  Physical Exam  Vitals and nursing note reviewed.   Constitutional:       General: She is not in acute distress.     Appearance: Normal appearance. She is not ill-appearing, toxic-appearing or diaphoretic.   HENT:      Head: Normocephalic and atraumatic.      Right Ear: There is no impacted cerumen. There is mastoid tenderness. Tympanic membrane is scarred and bulging.      Left Ear: There is no impacted cerumen. There is mastoid tenderness. Tympanic membrane is scarred, perforated and bulging.      Nose: Congestion and rhinorrhea present.      Mouth/Throat:      Mouth: Mucous membranes are moist.      Pharynx: No oropharyngeal exudate or posterior oropharyngeal erythema.   Eyes:      Extraocular Movements: Extraocular movements intact.      Conjunctiva/sclera: Conjunctivae normal.   Pulmonary:      Effort: Pulmonary effort is normal.      Breath sounds: Normal breath sounds.   Musculoskeletal:         General: Normal

## 2024-08-16 ENCOUNTER — HOSPITAL ENCOUNTER (OUTPATIENT)
Dept: WOMENS IMAGING | Age: 44
Discharge: HOME | End: 2024-08-16
Payer: COMMERCIAL

## 2024-08-16 DIAGNOSIS — Z12.31 VISIT FOR SCREENING MAMMOGRAM: ICD-10-CM

## 2024-08-16 PROCEDURE — 77063 BREAST TOMOSYNTHESIS BI: CPT
